# Patient Record
Sex: MALE | URBAN - METROPOLITAN AREA
[De-identification: names, ages, dates, MRNs, and addresses within clinical notes are randomized per-mention and may not be internally consistent; named-entity substitution may affect disease eponyms.]

---

## 2023-06-16 ENCOUNTER — TELEPHONE (OUTPATIENT)
Dept: UROLOGY | Facility: MEDICAL CENTER | Age: 68
End: 2023-06-16

## 2023-06-16 NOTE — TELEPHONE ENCOUNTER
New Patient Triage  Please Triage:   New Patient-   What is the reason for the patients appointment? BPH       Insurance: Medicare A & B  Do we accept the pt's insurance or is the patient Self-Pay? Provider & Plan:    Member ID#: [id number]  [employer]- [id number]      History  Has the pt had any previous urologist? DAMICO    Have pt records been requested? EPIC    Has the pt had any outside testing done?  NO    Does the pt have a personal history of cancer?: no